# Patient Record
Sex: MALE | URBAN - METROPOLITAN AREA
[De-identification: names, ages, dates, MRNs, and addresses within clinical notes are randomized per-mention and may not be internally consistent; named-entity substitution may affect disease eponyms.]

---

## 2020-08-05 ENCOUNTER — OFFICE VISIT (OUTPATIENT)
Dept: URBAN - METROPOLITAN AREA CLINIC 23 | Facility: CLINIC | Age: 60
End: 2020-08-05
Payer: COMMERCIAL

## 2020-08-05 DIAGNOSIS — H46.8 OTHER OPTIC NEURITIS: ICD-10-CM

## 2020-08-05 DIAGNOSIS — E11.9 TYPE 2 DIABETES MELLITUS W/O COMPLICATION: Primary | ICD-10-CM

## 2020-08-05 PROCEDURE — 92004 COMPRE OPH EXAM NEW PT 1/>: CPT | Performed by: OPTOMETRIST

## 2020-08-05 ASSESSMENT — INTRAOCULAR PRESSURE
OS: 10
OD: 12

## 2020-08-05 ASSESSMENT — KERATOMETRY
OS: 45.38
OD: 46.88

## 2020-08-05 NOTE — IMPRESSION/PLAN
Impression: Type 2 diabetes mellitus w/o complication: I20.5.  Plan: Return in one year with Dr. Shila Rodriguez for complete examination and Optos

## 2020-08-05 NOTE — IMPRESSION/PLAN
Impression: Nonexudative age-related macular degeneration, bilateral, early dry stage: H35.3131. Plan: Discussed findings.

## 2020-08-05 NOTE — IMPRESSION/PLAN
Impression: Other optic neuritis: H46.8. Plan: Discussed loss of color vision. Recommend further testing. VF 30-2 and retina consult.  He is in stage 4 liver failure

## 2020-08-25 ENCOUNTER — OFFICE VISIT (OUTPATIENT)
Dept: URBAN - METROPOLITAN AREA CLINIC 23 | Facility: CLINIC | Age: 60
End: 2020-08-25
Payer: COMMERCIAL

## 2020-08-25 PROCEDURE — 92004 COMPRE OPH EXAM NEW PT 1/>: CPT | Performed by: OPHTHALMOLOGY

## 2020-08-25 PROCEDURE — 92083 EXTENDED VISUAL FIELD XM: CPT | Performed by: OPHTHALMOLOGY

## 2020-08-25 PROCEDURE — 92134 CPTRZ OPH DX IMG PST SGM RTA: CPT | Performed by: OPHTHALMOLOGY

## 2020-08-25 ASSESSMENT — INTRAOCULAR PRESSURE
OD: 12
OS: 11

## 2020-08-25 NOTE — ASSESSMENT/PLAN
Impression: Diagnostic Test - Photo Optos: Good-Optic nerve pallor, macular atrophy no active bleeding or fluid Plan: see imp/plan

## 2020-08-25 NOTE — IMPRESSION/PLAN
Impression: Nonexudative age-related macular degeneration, bilateral, early dry stage: H35.3131. Bilateral. Condition: stable. Vision: vision not affected. Plan: Discussed diagnosis in detail with patient. No treatment is required at this time. Use of vitamins has shown to improve the effects of ARMD. Recommend AREDS 2 formula. Wear quality sunglasses and monitor vision at home with [address]. Call the office for an immediate appointment if [address] worsens. Educational material provided to patient. OCT performed today: no IRF or SRF OU - stable.

## 2020-08-25 NOTE — IMPRESSION/PLAN
Impression: Type 2 diabetes mellitus w/o complication: F17.9. Bilateral. Condition: stable. Vision: vision not affected. Plan: Discussed diagnosis in detail with patient. Optos shows minimal Diabetic changes. No treatment is recommended at this time. Emphasized blood sugar control and advised to keep future appointments with PCP and/or Endocrinologist for the management of Diabetes. Recommend observation for now.

## 2020-08-25 NOTE — IMPRESSION/PLAN
Impression: Other optic neuritis: H46.8. Bilateral. Condition: new problem addtl w/u needed. Vision: vision affected. Plan: Due to Coronavirus COVID-19 pandemic and National Emergency, deferred Slit Lamp examination. Findings are based on OCT and Optos. OCT shows stable no active edema OU and Optos shows Optic nerve pallor, macular atrophy, no active bleeding or fluid OU. HVF 30-2 preformed today, results show an extensive scotoma OS>OD. reviewed findings of diagnostic testing with patient. Patient recently did Ishihara color test on 8/5 results were 0/17 RIGHT EYE and 0/17 LEFT EYE. Patient states he never had been color blind previously or had problems with colors previously. Advised patient the changes in the back of the eye are not related to his diabetes or to his macular degeneration, the recent loss of color and loss in visual field are due to his optic nerve. Recommend a consult with a Neuro Ophthalmologist for further evaluation. Advised patient will be sending all of our tests and recent records to Dr. Chela Pearl MD at [facility] [place] or Dr. Kia Zendejas MD. Natalie Beauchamp patient their contact information to call and schedule an appointment.

## 2021-02-19 ENCOUNTER — OFFICE VISIT (OUTPATIENT)
Dept: URBAN - METROPOLITAN AREA CLINIC 23 | Facility: CLINIC | Age: 61
End: 2021-02-19
Payer: COMMERCIAL

## 2021-02-19 DIAGNOSIS — H35.3131 NONEXUDATIVE AGE-RELATED MACULAR DEGENERATION, BILATERAL, EARLY DRY STAGE: Primary | ICD-10-CM

## 2021-02-19 PROCEDURE — 92134 CPTRZ OPH DX IMG PST SGM RTA: CPT | Performed by: OPHTHALMOLOGY

## 2021-02-19 PROCEDURE — 99213 OFFICE O/P EST LOW 20 MIN: CPT | Performed by: OPHTHALMOLOGY

## 2021-02-19 ASSESSMENT — INTRAOCULAR PRESSURE
OD: 15
OS: 14

## 2021-03-29 ENCOUNTER — OFFICE VISIT (OUTPATIENT)
Dept: URBAN - METROPOLITAN AREA CLINIC 27 | Facility: CLINIC | Age: 61
End: 2021-03-29
Payer: COMMERCIAL

## 2021-03-29 DIAGNOSIS — Z96.1 PRESENCE OF INTRAOCULAR LENS: ICD-10-CM

## 2021-03-29 PROCEDURE — 92134 CPTRZ OPH DX IMG PST SGM RTA: CPT | Performed by: OPHTHALMOLOGY

## 2021-03-29 PROCEDURE — 99203 OFFICE O/P NEW LOW 30 MIN: CPT | Performed by: OPHTHALMOLOGY

## 2021-03-29 PROCEDURE — 92250 FUNDUS PHOTOGRAPHY W/I&R: CPT | Performed by: OPHTHALMOLOGY

## 2021-03-29 PROCEDURE — 92242 FLUORESCEIN&ICG ANGIOGRAPHY: CPT | Performed by: OPHTHALMOLOGY

## 2021-03-29 ASSESSMENT — INTRAOCULAR PRESSURE
OD: 10
OS: 8

## 2021-03-29 NOTE — IMPRESSION/PLAN
Impression: Nonexudative age-related macular degeneration, bilateral, advanced atrophic without subfoveal involvement: H35.3133. Plan: Exam/photos/OCT show GA without IRF/SRF c/w dry AMD.  FA shows staining without leakage. ICG shows window defect OU. The Dx, NHx, and prognosis of dry AMD were discussed at length. The pt was instructed to continue taking AREDS-2 protocol anti-oxidant vitamins.   Pt was instructed to RTC immediately prn dec [facility].

6 months, photos/OCT OU

## 2021-03-29 NOTE — IMPRESSION/PLAN
Impression: Type 2 diabetes mellitus without complications: V47.2. Plan: Exam/photos/FA show no DR/DME OU. The importance of blood sugar, blood pressure, and cholesterol control and their relationship to progression of diabetic retinopathy were reviewed.

## 2021-06-01 ENCOUNTER — OFFICE VISIT (OUTPATIENT)
Dept: URBAN - METROPOLITAN AREA CLINIC 23 | Facility: CLINIC | Age: 61
End: 2021-06-01
Payer: COMMERCIAL

## 2021-06-01 DIAGNOSIS — H35.3133 NEXDTVE AGE-REL MCLR DEGN, BI, ADV ATRPC WITHOUT SBFVL INVL: Primary | ICD-10-CM

## 2021-06-01 PROCEDURE — 92134 CPTRZ OPH DX IMG PST SGM RTA: CPT | Performed by: OPHTHALMOLOGY

## 2021-06-01 PROCEDURE — 99214 OFFICE O/P EST MOD 30 MIN: CPT | Performed by: OPHTHALMOLOGY

## 2021-06-01 ASSESSMENT — INTRAOCULAR PRESSURE
OD: 13
OS: 11

## 2021-06-01 NOTE — IMPRESSION/PLAN
Impression: Type 2 diabetes mellitus w/o complication: S21.2. Bilateral. Condition: stable. Plan: Discussed diagnosis in detail with patient. Exam shows no Diabetic changes OU. No treatment is recommended at this time. Emphasized blood sugar control and advised to keep future appointments with PCP and/or Endocrinologist for the management of Diabetes. Recommend observation for now.

## 2021-06-01 NOTE — IMPRESSION/PLAN
Impression: Nexdtve age-rel mclr degn, bi, adv atrpc without sbfvl invl: H31.4403. Bilateral.  Condition: established, stable. Vision: vision affected. Plan: Discussed diagnosis in detail with patient. No treatment is required at this time. Use of vitamins has shown to improve the effects of ARMD. Recommend AREDS 2 formula. Wear quality sunglasses and monitor vision at home with [address]. Call the office for an immediate appointment if [address] worsens. Educational material provided to patient. Recommend [employer] for further assistance with visual aids. consider looking into [id number] [place] [address] [facility] and [facility] fighting blindness, Macular Degeneration for the blind. OCT OD shows stable no active edema and OS shows partial VMT w/out foveal involvement. Optos OD shows extensive thinning, no girma fluid or heme and OS shows extensive thinning, benign Nevus, no girma fluid or heme.

## 2021-08-30 ENCOUNTER — OFFICE VISIT (OUTPATIENT)
Dept: URBAN - METROPOLITAN AREA CLINIC 23 | Facility: CLINIC | Age: 61
End: 2021-08-30
Payer: MEDICARE

## 2021-08-30 PROCEDURE — 99213 OFFICE O/P EST LOW 20 MIN: CPT | Performed by: OPHTHALMOLOGY

## 2021-08-30 PROCEDURE — 92134 CPTRZ OPH DX IMG PST SGM RTA: CPT | Performed by: OPHTHALMOLOGY

## 2021-08-30 ASSESSMENT — INTRAOCULAR PRESSURE
OS: 10
OD: 10

## 2021-08-30 NOTE — IMPRESSION/PLAN
Impression: Nexdtve age-rel mclr degn, bi, adv atrpc without sbfvl invl: H35.0824. Bilateral.  Condition: established, stable. Vision: vision affected. Plan: Due to Coronavirus COVID-19 pandemic and National Emergency, deferred Slit Lamp examination. Findings are based on OCT and Optos. OCT OD shows the macula is stable, no active edema OD and stable w/ ILM change OS. Optos shows extensive thinning of the retina, no active heme or fluid OU. No treatment is required at this time. Recommend a retina follow - up in 6 months, sooner if there is a sudden change. Due to patient being in the hospital, he was unable to use the visual resources recommended to him last time. Recommend again for patient to contact [employer] for further assistance with visual aids. consider looking into [facility] fighting blindness, Macular Degeneration for the blind. Printed chart notes and provided to patient today.

## 2021-08-30 NOTE — IMPRESSION/PLAN
Impression: Type 2 diabetes mellitus w/o complication: D04.7. Bilateral. Condition: stable. Plan: Due to Coronavirus COVID-19 pandemic and National Emergency, deferred Slit Lamp examination. Findings are based on OCT and Optos. OCT is stable OU, no active edema and Optos shows retinal thinning, no heme or fluid OU. Based on diagnostic findings recommend a yearly retina exam, sooner if there is a change or decrease in vision. Emphasized blood sugar control and advised to keep future appointments with PCP and/or Endocrinologist for the management of Diabetes.